# Patient Record
Sex: FEMALE | Race: BLACK OR AFRICAN AMERICAN | NOT HISPANIC OR LATINO | Employment: OTHER | URBAN - METROPOLITAN AREA
[De-identification: names, ages, dates, MRNs, and addresses within clinical notes are randomized per-mention and may not be internally consistent; named-entity substitution may affect disease eponyms.]

---

## 2024-11-19 ENCOUNTER — HOSPITAL ENCOUNTER (OUTPATIENT)
Dept: RADIOLOGY | Facility: CLINIC | Age: 69
Discharge: HOME | End: 2024-11-19
Payer: COMMERCIAL

## 2024-11-19 ENCOUNTER — OFFICE VISIT (OUTPATIENT)
Dept: ORTHOPEDIC SURGERY | Facility: CLINIC | Age: 69
End: 2024-11-19
Payer: COMMERCIAL

## 2024-11-19 VITALS — BODY MASS INDEX: 35.33 KG/M2 | WEIGHT: 192 LBS | HEIGHT: 62 IN

## 2024-11-19 DIAGNOSIS — S46.011A ROTATOR CUFF STRAIN, RIGHT, INITIAL ENCOUNTER: Primary | ICD-10-CM

## 2024-11-19 DIAGNOSIS — M25.511 ACUTE PAIN OF RIGHT SHOULDER: ICD-10-CM

## 2024-11-19 DIAGNOSIS — M19.011 ARTHRITIS OF RIGHT SHOULDER REGION: ICD-10-CM

## 2024-11-19 PROCEDURE — 99214 OFFICE O/P EST MOD 30 MIN: CPT | Performed by: ORTHOPAEDIC SURGERY

## 2024-11-19 PROCEDURE — 73030 X-RAY EXAM OF SHOULDER: CPT | Mod: RT

## 2024-11-21 NOTE — PROGRESS NOTES
69-year-old is seen for her right shoulder.  She is referred in consultation from Dr. Loving.  She has been having persistent severe sharp shooting pain in the right shoulder and difficulty lifting her arm.  She has difficulty with reaching and lifting activities.  She injured the shoulder at work when she tripped and fell over a milk crate on April 3, 2022.  She has had cortisone injection and physical therapy without improvement.  She has seen Dr. Lloyd who had recommended reverse shoulder replacement.  She is here for a another opinion.  She has been taking Norco.    Pleasant and no acute distress.  Right shoulder active forward flexion 60° with further passive to 150..  External rotation to 20 degrees with her arm at the side passively and internal rotation to the side.  No effusion or instability. There is positive Neer and Lees impingement. There is subacromial crepitus and tenderness around the subacromial space. There is biceps tenderness. There is a positive Holmes's test. No acromioclavicular tenderness. Rotator cuff strength is decreased and there is discomfort with strength testing. Left shoulder forward flexion 180°. No effusion or instability. No impingement or crepitus or tenderness involving the subacromial space. No biceps or acromioclavicular tenderness. There is intact rotator cuff strength. There is adequate range of motion of the cervical spine without pain. Both upper extremities are well perfused, skin is intact and muscle tone is adequate. Elbow flexion and extension and wrist flexion and extension strength are intact.    Multiple x-ray views of the right shoulder are personally reviewed and there is superior migration of the humeral head and joint space narrowing and osteophyte formation involving the glenohumeral joint.    MRI of the right shoulder was personally reviewed and there is tearing involving the rotator cuff including the entirety of the supraspinatus and infraspinatus and  superior aspect of the subscapularis tendon.  Long head of the bicep tendon is ruptured and retracted.  There is glenohumeral chondral loss and labral tearing.    A detailed discussion about her shoulder was done.  She has been through conservative treatment but has persistent debilitating pain and significant limitations in active range of motion involving her shoulder.  Reverse shoulder replacement would be the definitive treatment option.  This was discussed with her in detail.  Realistic expectations were discussed with the surgery.  The surgery and the postoperative course was discussed.  She is interested in proceeding with reverse shoulder replacement.  If she decides to proceed with the surgery with me then I would need her to have a CT scan per the [employer] protocol for preoperative planning with virtual implant positioning.

## 2024-12-19 ENCOUNTER — HOSPITAL ENCOUNTER (OUTPATIENT)
Dept: RADIOLOGY | Facility: HOSPITAL | Age: 69
Discharge: HOME | End: 2024-12-19
Payer: COMMERCIAL

## 2024-12-19 DIAGNOSIS — S46.011A ROTATOR CUFF STRAIN, RIGHT, INITIAL ENCOUNTER: ICD-10-CM

## 2024-12-19 PROCEDURE — 73200 CT UPPER EXTREMITY W/O DYE: CPT | Mod: RT

## 2024-12-30 ENCOUNTER — APPOINTMENT (OUTPATIENT)
Dept: RADIOLOGY | Facility: HOSPITAL | Age: 69
End: 2024-12-30
Payer: COMMERCIAL

## 2025-03-18 ENCOUNTER — OFFICE VISIT (OUTPATIENT)
Dept: ORTHOPEDIC SURGERY | Facility: CLINIC | Age: 70
End: 2025-03-18
Payer: COMMERCIAL

## 2025-03-18 VITALS — BODY MASS INDEX: 31.71 KG/M2 | HEIGHT: 63 IN | WEIGHT: 179 LBS

## 2025-03-18 DIAGNOSIS — S46.011A ROTATOR CUFF STRAIN, RIGHT, INITIAL ENCOUNTER: ICD-10-CM

## 2025-03-18 DIAGNOSIS — M19.011 ARTHRITIS OF RIGHT SHOULDER REGION: Primary | ICD-10-CM

## 2025-03-18 PROCEDURE — 99213 OFFICE O/P EST LOW 20 MIN: CPT | Performed by: ORTHOPAEDIC SURGERY

## 2025-03-18 RX ORDER — GABAPENTIN 600 MG/1
1200 TABLET ORAL 3 TIMES DAILY
COMMUNITY
Start: 2023-05-03 | End: 2025-09-06

## 2025-03-18 RX ORDER — PHENYTOIN SODIUM 100 MG/1
100 CAPSULE, EXTENDED RELEASE ORAL 4 TIMES DAILY
COMMUNITY

## 2025-03-18 NOTE — LETTER
March 19, 2025     Alonso Vernon MD  [address]  [address]  [facility] [address]    Patient: Adrianna Burroughs   YOB: 1955   Date of Visit: 3/18/2025       Dear Dr. Alonos Vernon MD:    Thank you for referring Adrianna Burroughs to me for evaluation. Below are my notes for this consultation.  If you have questions, please do not hesitate to call me. I look forward to following your patient along with you.       Sincerely,     Ruben Krause MD      CC: No Recipients  ______________________________________________________________________________________    69-year-old is seen in follow-up was originally for her right shoulder.  She is referred in consultation from Dr. Loving.  She has been having persistent severe sharp shooting pain in the right shoulder and difficulty lifting her arm.  She has difficulty with reaching and lifting activities.  She injured the shoulder at work when she tripped and fell over a milk crate on April 3, 2022.  She has had cortisone injection and physical therapy without improvement.  She has seen Dr. Lloyd who had recommended reverse shoulder replacement.   She has been taking Norco.  Her symptoms have persisted.    Pleasant and no acute distress.  Right shoulder active forward flexion 50° with further passive to 150..  External rotation to 20 degrees with her arm at the side passively and internal rotation to the side.  No effusion or instability. There is positive Neer and Lees impingement. There is subacromial crepitus and tenderness around the subacromial space. There is biceps tenderness. There is a positive [place]'s test. No acromioclavicular tenderness. Rotator cuff strength is decreased and there is discomfort with strength testing. Left shoulder forward flexion 180°. No effusion or instability. No impingement or crepitus or tenderness involving the subacromial space. No biceps or acromioclavicular tenderness. There is intact rotator cuff  strength. There is adequate range of motion of the cervical spine without pain. Both upper extremities are well perfused, skin is intact and muscle tone is adequate. Elbow flexion and extension and wrist flexion and extension strength are intact.    Multiple x-ray views of the right shoulder are personally reviewed and there is superior migration of the humeral head and joint space narrowing and osteophyte formation involving the glenohumeral joint.    MRI of the right shoulder was personally reviewed and there is tearing involving the rotator cuff including the entirety of the supraspinatus and infraspinatus and superior aspect of the subscapularis tendon.  Long head of the bicep tendon is ruptured and retracted.  There is glenohumeral chondral loss and labral tearing.    CT scan of the right shoulder was personally reviewed and there are advanced degenerative changes.    A detailed discussion about her shoulder was done.  She has been through conservative treatment but has persistent debilitating pain and significant limitations in active range of motion involving her shoulder.  Reverse shoulder replacement is the definitive treatment option.  She would like to proceed with this at some point.  She would benefit from working on strengthening of the shoulder in the meantime.  She will avoid aggravating activities.

## 2025-03-19 NOTE — PROGRESS NOTES
69-year-old is seen in follow-up was originally for her right shoulder.  She is referred in consultation from Dr. Loving.  She has been having persistent severe sharp shooting pain in the right shoulder and difficulty lifting her arm.  She has difficulty with reaching and lifting activities.  She injured the shoulder at work when she tripped and fell over a milk crate on April 3, 2022.  She has had cortisone injection and physical therapy without improvement.  She has seen Dr. Lloyd who had recommended reverse shoulder replacement.   She has been taking Norco.  Her symptoms have persisted.    Pleasant and no acute distress.  Right shoulder active forward flexion 50° with further passive to 150..  External rotation to 20 degrees with her arm at the side passively and internal rotation to the side.  No effusion or instability. There is positive Neer and Lees impingement. There is subacromial crepitus and tenderness around the subacromial space. There is biceps tenderness. There is a positive [place]'s test. No acromioclavicular tenderness. Rotator cuff strength is decreased and there is discomfort with strength testing. Left shoulder forward flexion 180°. No effusion or instability. No impingement or crepitus or tenderness involving the subacromial space. No biceps or acromioclavicular tenderness. There is intact rotator cuff strength. There is adequate range of motion of the cervical spine without pain. Both upper extremities are well perfused, skin is intact and muscle tone is adequate. Elbow flexion and extension and wrist flexion and extension strength are intact.    Multiple x-ray views of the right shoulder are personally reviewed and there is superior migration of the humeral head and joint space narrowing and osteophyte formation involving the glenohumeral joint.    MRI of the right shoulder was personally reviewed and there is tearing involving the rotator cuff including the entirety of the  supraspinatus and infraspinatus and superior aspect of the subscapularis tendon.  Long head of the bicep tendon is ruptured and retracted.  There is glenohumeral chondral loss and labral tearing.    CT scan of the right shoulder was personally reviewed and there are advanced degenerative changes.    A detailed discussion about her shoulder was done.  She has been through conservative treatment but has persistent debilitating pain and significant limitations in active range of motion involving her shoulder.  Reverse shoulder replacement is the definitive treatment option.  She would like to proceed with this at some point.  She would benefit from working on strengthening of the shoulder in the meantime.  She will avoid aggravating activities.

## 2025-04-25 ENCOUNTER — TELEPHONE (OUTPATIENT)
Dept: ORTHOPEDIC SURGERY | Facility: CLINIC | Age: 70
End: 2025-04-25
Payer: COMMERCIAL

## 2025-04-25 NOTE — TELEPHONE ENCOUNTER
Patient called in wanting to discuss alternative surgery for her RT shoulder.    Requested a return phone call.

## 2025-05-06 ENCOUNTER — OFFICE VISIT (OUTPATIENT)
Dept: ORTHOPEDIC SURGERY | Facility: CLINIC | Age: 70
End: 2025-05-06
Payer: COMMERCIAL

## 2025-05-06 DIAGNOSIS — M19.011 ARTHRITIS OF RIGHT SHOULDER REGION: Primary | ICD-10-CM

## 2025-05-06 DIAGNOSIS — S46.011A ROTATOR CUFF STRAIN, RIGHT, INITIAL ENCOUNTER: ICD-10-CM

## 2025-05-06 PROCEDURE — 99213 OFFICE O/P EST LOW 20 MIN: CPT | Performed by: ORTHOPAEDIC SURGERY

## 2025-05-07 NOTE — PROGRESS NOTES
69-year-old is seen in follow-up was originally for her right shoulder.  She is referred in consultation from Dr. Yepez.  She has been having continued persistent severe sharp shooting pain in the right shoulder and difficulty lifting her arm.  She has difficulty with reaching and lifting activities.  She injured the shoulder at work when she tripped and fell over a milk crate on April 3, 2022.  She has had cortisone injection and physical therapy without improvement.  She has seen Dr. Lloyd who had recommended reverse shoulder replacement.   She has been taking Norco.  Her symptoms have persisted.    Pleasant and no acute distress.  Right shoulder active forward flexion 60° with further passive to 140..  External rotation to 20 degrees with her arm at the side passively and internal rotation to the side.  No effusion or instability. There is positive Neer and Lees impingement. There is subacromial crepitus and tenderness around the subacromial space. There is biceps tenderness. There is a positive [place]'s test. No acromioclavicular tenderness. Rotator cuff strength is decreased and there is discomfort with strength testing. Left shoulder forward flexion 180°. No effusion or instability. No impingement or crepitus or tenderness involving the subacromial space. No biceps or acromioclavicular tenderness. There is intact rotator cuff strength. There is adequate range of motion of the cervical spine without pain. Both upper extremities are well perfused, skin is intact and muscle tone is adequate. Elbow flexion and extension and wrist flexion and extension strength are intact.    Multiple x-ray views of the right shoulder are personally reviewed and there is superior migration of the humeral head and joint space narrowing and osteophyte formation involving the glenohumeral joint.    MRI of the right shoulder was personally reviewed and there is tearing involving the rotator cuff including the entirety of the  supraspinatus and infraspinatus and superior aspect of the subscapularis tendon.  Long head of the bicep tendon is ruptured and retracted.  There is glenohumeral chondral loss and labral tearing.    CT scan of the right shoulder was personally reviewed and there are advanced degenerative changes.    A detailed discussion about her shoulder was done.  She has been through conservative treatment but has persistent debilitating pain and significant limitations in active range of motion involving her shoulder.  Reverse shoulder replacement is the definitive treatment option.  She has been continuing with an exercise program and trying to avoid aggravating activities.  At some point she would like to proceed with reverse shoulder replacement.